# Patient Record
Sex: MALE | Race: WHITE | NOT HISPANIC OR LATINO | URBAN - METROPOLITAN AREA
[De-identification: names, ages, dates, MRNs, and addresses within clinical notes are randomized per-mention and may not be internally consistent; named-entity substitution may affect disease eponyms.]

---

## 2017-11-06 ENCOUNTER — APPOINTMENT (RX ONLY)
Dept: URBAN - METROPOLITAN AREA CLINIC 44 | Facility: CLINIC | Age: 67
Setting detail: DERMATOLOGY
End: 2017-11-06

## 2017-11-06 ENCOUNTER — APPOINTMENT (RX ONLY)
Dept: URBAN - METROPOLITAN AREA CLINIC 45 | Facility: CLINIC | Age: 67
Setting detail: DERMATOLOGY
End: 2017-11-06

## 2017-11-06 DIAGNOSIS — L01.01 NON-BULLOUS IMPETIGO: ICD-10-CM

## 2017-11-06 PROBLEM — Z85.828 PERSONAL HISTORY OF OTHER MALIGNANT NEOPLASM OF SKIN: Status: ACTIVE | Noted: 2017-11-06

## 2017-11-06 PROBLEM — J30.1 ALLERGIC RHINITIS DUE TO POLLEN: Status: ACTIVE | Noted: 2017-11-06

## 2017-11-06 PROBLEM — Z85828 PERSONAL HISTORY OF OTHER MALIGNANT NEOPLASM OF SKIN: Status: ACTIVE | Noted: 2017-11-06

## 2017-11-06 PROBLEM — L29.8 OTHER PRURITUS: Status: ACTIVE | Noted: 2017-11-06

## 2017-11-06 PROCEDURE — ? PRESCRIPTION

## 2017-11-06 PROCEDURE — 99212 OFFICE O/P EST SF 10 MIN: CPT

## 2017-11-06 PROCEDURE — ? ORDER TESTS

## 2017-11-06 PROCEDURE — ? COUNSELING

## 2017-11-06 PROCEDURE — ? ADDITIONAL NOTES

## 2017-11-06 RX ORDER — MUPIROCIN 20 MG/G
OINTMENT TOPICAL TWICE DAILY
Qty: 1 | Refills: 0 | Status: ERX | COMMUNITY
Start: 2017-11-06

## 2017-11-06 RX ADMIN — MUPIROCIN: 20 OINTMENT TOPICAL at 00:00

## 2017-11-06 ASSESSMENT — LOCATION DETAILED DESCRIPTION DERM
LOCATION DETAILED: LEFT DORSAL 2ND TOE
LOCATION DETAILED: LEFT DORSAL GREAT TOE
LOCATION DETAILED: LEFT DORSAL GREAT TOE
LOCATION DETAILED: LEFT DORSAL 2ND TOE

## 2017-11-06 ASSESSMENT — LOCATION SIMPLE DESCRIPTION DERM
LOCATION SIMPLE: LEFT GREAT TOE
LOCATION SIMPLE: LEFT 2ND TOE
LOCATION SIMPLE: LEFT GREAT TOE
LOCATION SIMPLE: LEFT 2ND TOE

## 2017-11-06 ASSESSMENT — LOCATION ZONE DERM
LOCATION ZONE: TOE
LOCATION ZONE: TOE

## 2017-11-06 NOTE — PROCEDURE: ADDITIONAL NOTES
Additional Notes: This looks impetiginized today.\\nBacterial swab sent for C&S.\\nThe underlying process may be acute eczematous dermatitis.  \\nI will treat with MUPIROCIN BID x 2w, then reevaluate at 2w to see if more management is needed.

## 2017-11-06 NOTE — PROCEDURE: MIPS QUALITY
Quality 130: Documentation Of Current Medications In The Medical Record: Current Medications Documented
Quality 110: Preventive Care And Screening: Influenza Immunization: Influenza Immunization previously received during influenza season
Quality 226: Preventive Care And Screening: Tobacco Use: Screening And Cessation Intervention: Patient screened for tobacco and is an ex-smoker
Quality 431: Preventive Care And Screening: Unhealthy Alcohol Use - Screening: Patient screened for unhealthy alcohol use using a single question and scores less than 2 times per year
Detail Level: Detailed

## 2017-11-06 NOTE — HPI: RASH
How Severe Is Your Rash?: mild
Is This A New Presentation, Or A Follow-Up?: Rash
Additional History: Patient states that OTC do help with rash but it did not completely heal the rash.

## 2017-11-06 NOTE — PROCEDURE: MIPS QUALITY
Quality 110: Preventive Care And Screening: Influenza Immunization: Influenza Immunization previously received during influenza season
Quality 130: Documentation Of Current Medications In The Medical Record: Current Medications Documented
Detail Level: Detailed
Quality 226: Preventive Care And Screening: Tobacco Use: Screening And Cessation Intervention: Patient screened for tobacco and is an ex-smoker
Quality 431: Preventive Care And Screening: Unhealthy Alcohol Use - Screening: Patient screened for unhealthy alcohol use using a single question and scores less than 2 times per year

## 2017-11-06 NOTE — PROCEDURE: ORDER TESTS
Performing Laboratory: [id number]
Billing Type: Third-Party Bill
Bill For Surgical Tray: no
Lab Facility: [id number]
Expected Date Of Service: 11/06/2017

## 2017-11-29 ENCOUNTER — APPOINTMENT (RX ONLY)
Dept: URBAN - METROPOLITAN AREA CLINIC 45 | Facility: CLINIC | Age: 67
Setting detail: DERMATOLOGY
End: 2017-11-29

## 2017-11-29 ENCOUNTER — APPOINTMENT (RX ONLY)
Dept: URBAN - METROPOLITAN AREA CLINIC 44 | Facility: CLINIC | Age: 67
Setting detail: DERMATOLOGY
End: 2017-11-29

## 2017-11-29 DIAGNOSIS — L01.01 NON-BULLOUS IMPETIGO: ICD-10-CM

## 2017-11-29 PROCEDURE — ? TREATMENT REGIMEN

## 2017-11-29 PROCEDURE — ? ADDITIONAL NOTES

## 2017-11-29 PROCEDURE — 99213 OFFICE O/P EST LOW 20 MIN: CPT

## 2017-11-29 PROCEDURE — ? PRESCRIPTION

## 2017-11-29 PROCEDURE — ? COUNSELING

## 2017-11-29 RX ORDER — AMOXICILLIN AND CLAVULANATE POTASSIUM 500; 125 MG/1; 1/1
TABLET, FILM COATED ORAL TWICE DAILY
Qty: 28 | Refills: 0 | Status: ERX | COMMUNITY
Start: 2017-11-29

## 2017-11-29 RX ORDER — MUPIROCIN 20 MG/G
OINTMENT TOPICAL TWICE DAILY
Qty: 1 | Refills: 0 | Status: ERX

## 2017-11-29 RX ADMIN — AMOXICILLIN AND CLAVULANATE POTASSIUM: 500; 125 TABLET, FILM COATED ORAL at 00:00

## 2017-11-29 ASSESSMENT — LOCATION SIMPLE DESCRIPTION DERM
LOCATION SIMPLE: LEFT GREAT TOE
LOCATION SIMPLE: LEFT GREAT TOE
LOCATION SIMPLE: LEFT 2ND TOE
LOCATION SIMPLE: LEFT 2ND TOE

## 2017-11-29 ASSESSMENT — LOCATION DETAILED DESCRIPTION DERM
LOCATION DETAILED: LEFT DORSAL 2ND TOE
LOCATION DETAILED: LEFT DORSAL 2ND TOE
LOCATION DETAILED: LEFT DORSAL GREAT TOE
LOCATION DETAILED: LEFT DORSAL GREAT TOE

## 2017-11-29 ASSESSMENT — LOCATION ZONE DERM
LOCATION ZONE: TOE
LOCATION ZONE: TOE

## 2017-11-29 NOTE — PROCEDURE: ADDITIONAL NOTES
Additional Notes: This is eczematous dermatitis with superimposed impetigo that is 50% improved after using MUPIROCIN.  Patient says very itchy. \\n Cultures returned (+) MSSA, (+) Acinetobacter baumannii.  Patient states his wife was recently treated for a Staph infection as well.  \\n\\nContinue MUPIROCIN luisana BID an additional 14d.\\nStart MUPIROCIN luisana BID x 5d to nares.\\nStart AUGMENTIN 500 mg BID x 14d, reviewed AEs, including HA, GI upset, and rash; patient will call with concerns.\\nStart HALOG luisana daily x 10d, 3 sample tubes given; this is because there may be underlying eczema and patient complains of significant itch.\\n\\nNB:  Patient unhappy with phone answering system; says he called and my name is not on menu.  I called phone number on speaker while I was in the room with him, and my name is listed.  \\n\\nRTC 1 month; consider biopsy or other testing if not responsive.

## 2017-11-29 NOTE — PROCEDURE: MIPS QUALITY
Quality 130: Documentation Of Current Medications In The Medical Record: Current Medications Documented
Detail Level: Detailed
Quality 226: Preventive Care And Screening: Tobacco Use: Screening And Cessation Intervention: Patient screened for tobacco and is an ex-smoker
Quality 110: Preventive Care And Screening: Influenza Immunization: Influenza Immunization previously received during influenza season
Quality 431: Preventive Care And Screening: Unhealthy Alcohol Use - Screening: Patient screened for unhealthy alcohol use using a single question and scores less than 2 times per year

## 2017-11-29 NOTE — PROCEDURE: TREATMENT REGIMEN
Detail Level: Zone
Continue Regimen: Mupirocin Ointment x 14 days to left foot \\nMupirocin to nostril x 5 days
Initiate Treatment: Augmentin Twice daily x 14 days

## 2017-11-29 NOTE — PROCEDURE: MIPS QUALITY
Quality 110: Preventive Care And Screening: Influenza Immunization: Influenza Immunization previously received during influenza season
Detail Level: Detailed
Quality 431: Preventive Care And Screening: Unhealthy Alcohol Use - Screening: Patient screened for unhealthy alcohol use using a single question and scores less than 2 times per year
Quality 130: Documentation Of Current Medications In The Medical Record: Current Medications Documented
Quality 226: Preventive Care And Screening: Tobacco Use: Screening And Cessation Intervention: Patient screened for tobacco and is an ex-smoker

## 2017-11-29 NOTE — PROCEDURE: ADDITIONAL NOTES
Additional Notes: This is eczematous dermatitis with superimposed impetigo that is 50% improved after using MUPIROCIN.  Patient says very itchy. \\n Cultures returned (+) MSSA, (+) Acinetobacter baumannii.  Patient states his wife was recently treated for a Staph infection as well.  \\n\\nContinue MUPIROCIN morena BID an additional 14d.\\nStart MUPIROCIN morena BID x 5d to nares.\\nStart AUGMENTIN 500 mg BID x 14d, reviewed AEs, including HA, GI upset, and rash; patient will call with concerns.\\nStart HALOG morena daily x 10d, 3 sample tubes given; this is because there may be underlying eczema and patient complains of significant itch.\\n\\nNB:  Patient unhappy with phone answering system; says he called and my name is not on menu.  I called phone number on speaker while I was in the room with him, and my name is listed.  \\n\\nRTC 1 month; consider biopsy or other testing if not responsive.

## 2018-01-05 ENCOUNTER — APPOINTMENT (RX ONLY)
Dept: URBAN - METROPOLITAN AREA CLINIC 45 | Facility: CLINIC | Age: 68
Setting detail: DERMATOLOGY
End: 2018-01-05

## 2018-01-05 ENCOUNTER — APPOINTMENT (RX ONLY)
Dept: URBAN - METROPOLITAN AREA CLINIC 44 | Facility: CLINIC | Age: 68
Setting detail: DERMATOLOGY
End: 2018-01-05

## 2018-01-05 DIAGNOSIS — L01.01 NON-BULLOUS IMPETIGO: ICD-10-CM

## 2018-01-05 PROBLEM — J30.1 ALLERGIC RHINITIS DUE TO POLLEN: Status: ACTIVE | Noted: 2018-01-05

## 2018-01-05 PROCEDURE — 99213 OFFICE O/P EST LOW 20 MIN: CPT

## 2018-01-05 PROCEDURE — ? ADDITIONAL NOTES

## 2018-01-05 PROCEDURE — ? COUNSELING

## 2018-01-05 PROCEDURE — ? PRESCRIPTION

## 2018-01-05 RX ORDER — AMOXICILLIN AND CLAVULANATE POTASSIUM 500; 125 MG/1; 1/1
TABLET, FILM COATED ORAL TWICE DAILY
Qty: 60 | Refills: 0 | Status: CANCELLED

## 2018-01-05 RX ORDER — AMOXICILLIN AND CLAVULANATE POTASSIUM 875; 125 MG/1; 1/1
TABLET, FILM COATED ORAL
Qty: 60 | Refills: 0 | Status: ERX | COMMUNITY
Start: 2018-01-05

## 2018-01-05 RX ORDER — GENTAMICIN SULFATE 1 MG/G
OINTMENT TOPICAL
Qty: 1 | Refills: 2 | Status: ERX | COMMUNITY
Start: 2018-01-05

## 2018-01-05 RX ADMIN — AMOXICILLIN AND CLAVULANATE POTASSIUM: 875; 125 TABLET, FILM COATED ORAL at 00:00

## 2018-01-05 RX ADMIN — GENTAMICIN SULFATE: 1 OINTMENT TOPICAL at 00:00

## 2018-01-05 ASSESSMENT — LOCATION SIMPLE DESCRIPTION DERM
LOCATION SIMPLE: LEFT 2ND TOE
LOCATION SIMPLE: LEFT GREAT TOE
LOCATION SIMPLE: LEFT 2ND TOE
LOCATION SIMPLE: LEFT GREAT TOE

## 2018-01-05 ASSESSMENT — LOCATION ZONE DERM
LOCATION ZONE: TOE
LOCATION ZONE: TOE

## 2018-01-05 NOTE — PROCEDURE: MIPS QUALITY
Quality 431: Preventive Care And Screening: Unhealthy Alcohol Use - Screening: Patient screened for unhealthy alcohol use using a single question and scores less than 2 times per year
Quality 110: Preventive Care And Screening: Influenza Immunization: Influenza Immunization previously received during influenza season
Quality 111:Pneumonia Vaccination Status For Older Adults: Pneumococcal Vaccination not Administered or Previously Received, Reason not Otherwise Specified
Detail Level: Detailed
Quality 226: Preventive Care And Screening: Tobacco Use: Screening And Cessation Intervention: Patient screened for tobacco and is an ex-smoker
Quality 130: Documentation Of Current Medications In The Medical Record: Current Medications Documented
Quality 47: Advance Care Plan: Advance Care Planning discussed and documented in the medical record; patient did not wish or was not able to name a surrogate decision maker or provide an advance care plan.

## 2018-01-05 NOTE — PROCEDURE: ADDITIONAL NOTES
Additional Notes: This is impetigo with culture (+) MSSA and Acinetobacter baumannii, that has been recalcitrant.  He has used MUPIROCIN luisana BID x 4 weeks, which did not seem to help.  He took 2 weeks of AUGMENTIN 500 mg BID which seemed to help but then it returned when he was off for the past 2 weeks.  He was also using CLODERM cream on it for a few days as well to treat any underlying eczematous component.\\n\\nThis has been challenging to treat, and patient is frustrated that it is not resolved.  He is also frustrated that he got an EOB from his insurance company saying that the bacterial culture we sent is not covered at [facility].  I told him to contact us if he gets an actual bill; otherwise, he will need to contact his insurance company for further information.  I gave him my cell phone number because he is frustrated with having difficulty getting through our phone system.\\n\\nClinically, it looks inflamed and exudative today.\\n\\nAUGMENTIN 750 mg BID x 4 weeks.\\nGENTAMICIN luisana BID x 4 weeks.\\n\\nPatient will call if not getting better at 2 weeks.\\n\\nRTC 4 weeks.

## 2018-01-05 NOTE — PROCEDURE: ADDITIONAL NOTES
Additional Notes: This is impetigo with culture (+) MSSA and Acinetobacter baumannii, that has been recalcitrant.  He has used MUPIROCIN morena BID x 4 weeks, which did not seem to help.  He took 2 weeks of AUGMENTIN 500 mg BID which seemed to help but then it returned when he was off for the past 2 weeks.  He was also using CLODERM cream on it for a few days as well to treat any underlying eczematous component.\\n\\nThis has been challenging to treat, and patient is frustrated that it is not resolved.  He is also frustrated that he got an EOB from his insurance company saying that the bacterial culture we sent is not covered at [facility].  I told him to contact us if he gets an actual bill; otherwise, he will need to contact his insurance company for further information.  I gave him my cell phone number because he is frustrated with having difficulty getting through our phone system.\\n\\nClinically, it looks inflamed and exudative today.\\n\\nAUGMENTIN 750 mg BID x 4 weeks.\\nGENTAMICIN morena BID x 4 weeks.\\n\\nPatient will call if not getting better at 2 weeks.\\n\\nRTC 4 weeks.

## 2018-01-05 NOTE — PROCEDURE: MIPS QUALITY
Quality 47: Advance Care Plan: Advance Care Planning discussed and documented in the medical record; patient did not wish or was not able to name a surrogate decision maker or provide an advance care plan.
Quality 226: Preventive Care And Screening: Tobacco Use: Screening And Cessation Intervention: Patient screened for tobacco and is an ex-smoker
Quality 111:Pneumonia Vaccination Status For Older Adults: Pneumococcal Vaccination not Administered or Previously Received, Reason not Otherwise Specified
Quality 431: Preventive Care And Screening: Unhealthy Alcohol Use - Screening: Patient screened for unhealthy alcohol use using a single question and scores less than 2 times per year
Quality 130: Documentation Of Current Medications In The Medical Record: Current Medications Documented
Detail Level: Detailed
Quality 110: Preventive Care And Screening: Influenza Immunization: Influenza Immunization previously received during influenza season

## 2018-02-02 ENCOUNTER — APPOINTMENT (RX ONLY)
Dept: URBAN - METROPOLITAN AREA CLINIC 44 | Facility: CLINIC | Age: 68
Setting detail: DERMATOLOGY
End: 2018-02-02

## 2018-02-02 ENCOUNTER — APPOINTMENT (RX ONLY)
Dept: URBAN - METROPOLITAN AREA CLINIC 45 | Facility: CLINIC | Age: 68
Setting detail: DERMATOLOGY
End: 2018-02-02

## 2018-02-02 DIAGNOSIS — L01.01 NON-BULLOUS IMPETIGO: ICD-10-CM | Status: IMPROVED

## 2018-02-02 PROCEDURE — 99213 OFFICE O/P EST LOW 20 MIN: CPT

## 2018-02-02 PROCEDURE — ? COUNSELING

## 2018-02-02 PROCEDURE — ? ADDITIONAL NOTES

## 2018-02-02 ASSESSMENT — LOCATION DETAILED DESCRIPTION DERM
LOCATION DETAILED: LEFT DORSAL GREAT TOE
LOCATION DETAILED: LEFT DORSAL GREAT TOE
LOCATION DETAILED: LEFT DORSAL 2ND TOE
LOCATION DETAILED: LEFT DORSAL 2ND TOE

## 2018-02-02 ASSESSMENT — LOCATION ZONE DERM
LOCATION ZONE: TOE
LOCATION ZONE: TOE

## 2018-02-02 NOTE — PROCEDURE: MIPS QUALITY
Quality 226: Preventive Care And Screening: Tobacco Use: Screening And Cessation Intervention: Patient screened for tobacco and is an ex-smoker
Quality 111:Pneumonia Vaccination Status For Older Adults: Pneumococcal Vaccination not Administered or Previously Received, Reason not Otherwise Specified
Detail Level: Detailed
Quality 47: Advance Care Plan: Advance Care Planning discussed and documented in the medical record; patient did not wish or was not able to name a surrogate decision maker or provide an advance care plan.
Quality 431: Preventive Care And Screening: Unhealthy Alcohol Use - Screening: Patient screened for unhealthy alcohol use using a single question and scores less than 2 times per year
Quality 130: Documentation Of Current Medications In The Medical Record: Current Medications Documented
Quality 110: Preventive Care And Screening: Influenza Immunization: Influenza Immunization previously received during influenza season

## 2018-02-02 NOTE — PROCEDURE: ADDITIONAL NOTES
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  I have seen him 4 times, starting in Nov 2017, when bacterial swab grew (+) Acinetobacter baumanii and Staphylococcus aureus.  It is itchy but not painful.\\n\\nMy treatments so far, in order:\\n1.  MUPIROCIN mroena BID x 2w\\n2.  MUPIROCIN morena BID x 2w plus AUGMENTIN 500 mg BID x 2w plus HALOG morena QD x 10d\\n3.  GENTAMICIN morena BID x 30d plus AUGMENTIN 750 mg BID x 30d\\n\\nThis looks like a subacute eczematous dermatitis with superimposed impetigo that is about 50-75% improved.  DDx:  contact dermatitis component or deep fungal infection?  I am hesitant to biopsy because it has taken so long to heal to this point, and I am unsure if it would provide valuable information.\\n\\Jack this point, it is not weeping serous exudate, but there is still edema with dusky erythema on the L great toe and L 2nd toe.  Patient is frustrated and prefers to see a clinician with more experience.  I will see if we can schedule with Dr. Heath.  May continue topical GENTAMICIN omrena and MUPIROCIN morena, mixed 50:50, until then.

## 2018-02-02 NOTE — PROCEDURE: MIPS QUALITY
Quality 226: Preventive Care And Screening: Tobacco Use: Screening And Cessation Intervention: Patient screened for tobacco and is an ex-smoker
Detail Level: Detailed
Quality 111:Pneumonia Vaccination Status For Older Adults: Pneumococcal Vaccination not Administered or Previously Received, Reason not Otherwise Specified
Quality 130: Documentation Of Current Medications In The Medical Record: Current Medications Documented
Quality 110: Preventive Care And Screening: Influenza Immunization: Influenza Immunization previously received during influenza season
Quality 431: Preventive Care And Screening: Unhealthy Alcohol Use - Screening: Patient screened for unhealthy alcohol use using a single question and scores less than 2 times per year
Quality 47: Advance Care Plan: Advance Care Planning discussed and documented in the medical record; patient did not wish or was not able to name a surrogate decision maker or provide an advance care plan.

## 2018-02-02 NOTE — PROCEDURE: ADDITIONAL NOTES
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  I have seen him 4 times, starting in Nov 2017, when bacterial swab grew (+) Acinetobacter baumanii and Staphylococcus aureus.  It is itchy but not painful.\\n\\nMy treatments so far, in order:\\n1.  MUPIROCIN luisana BID x 2w\\n2.  MUPIROCIN luisana BID x 2w plus AUGMENTIN 500 mg BID x 2w plus HALOG luisana QD x 10d\\n3.  GENTAMICIN luisana BID x 30d plus AUGMENTIN 750 mg BID x 30d\\n\\nThis looks like a subacute eczematous dermatitis with superimposed impetigo that is about 50-75% improved.  DDx:  contact dermatitis component or deep fungal infection?  I am hesitant to biopsy because it has taken so long to heal to this point, and I am unsure if it would provide valuable information.\\n\\Denise this point, it is not weeping serous exudate, but there is still edema with dusky erythema on the L great toe and L 2nd toe.  Patient is frustrated and prefers to see a clinician with more experience.  I will see if we can schedule with Dr. Aviles.  May continue topical GENTAMICIN luisana and MUPIROCIN luisana, mixed 50:50, until then.

## 2018-02-05 ENCOUNTER — RX ONLY (OUTPATIENT)
Age: 68
Setting detail: RX ONLY
End: 2018-02-05

## 2018-02-05 RX ORDER — TRIAMCINOLONE ACETONIDE 1 MG/G
OINTMENT TOPICAL
Qty: 1 | Refills: 0 | Status: ERX | COMMUNITY
Start: 2018-02-05

## 2022-05-27 NOTE — PROCEDURE: TREATMENT REGIMEN
Price (Do Not Change): 0.00
Instructions: This plan will send the code FBSE to the PM system.  DO NOT or CHANGE the price.
Initiate Treatment: Augmentin Twice daily x 14 days
Detail Level: Simple
Detail Level: Zone
Continue Regimen: Mupirocin Ointment x 14 days to left foot \\nMupirocin to nostril x 5 days